# Patient Record
Sex: FEMALE | Race: BLACK OR AFRICAN AMERICAN
[De-identification: names, ages, dates, MRNs, and addresses within clinical notes are randomized per-mention and may not be internally consistent; named-entity substitution may affect disease eponyms.]

---

## 2021-04-29 ENCOUNTER — HOSPITAL ENCOUNTER (EMERGENCY)
Dept: HOSPITAL 12 - ER | Age: 36
LOS: 1 days | Discharge: HOME | End: 2021-04-30
Payer: COMMERCIAL

## 2021-04-29 VITALS — BODY MASS INDEX: 31.54 KG/M2 | WEIGHT: 178 LBS | HEIGHT: 63 IN

## 2021-04-29 DIAGNOSIS — Z20.822: ICD-10-CM

## 2021-04-29 DIAGNOSIS — J45.909: ICD-10-CM

## 2021-04-29 DIAGNOSIS — R05: Primary | ICD-10-CM

## 2021-04-29 PROCEDURE — A4663 DIALYSIS BLOOD PRESSURE CUFF: HCPCS

## 2021-04-30 VITALS — DIASTOLIC BLOOD PRESSURE: 87 MMHG | SYSTOLIC BLOOD PRESSURE: 122 MMHG

## 2021-04-30 NOTE — NUR
Patient discharged to home in stable condition.  Written and verbal after care 
instructions given. 

Patient verbalizes understanding of instructions. Stressed follow up or return 
to ER for worsening s/s.

All belongings with patient. VSS. Steady gait. Instructed not to drive.

## 2021-04-30 NOTE — NUR
36 y/o female presents to ED from home for persistent cough. Cough has been 
present for "couple months" but has gotten worse as of 6PM last night. Cough is 
dry, accompanied by a runny nose, usually present in the evenings, 1 episode of 
vommiting, and has a diminished sense of taste. Has tried taking Allegra for 
relief to no avail. 



Patient states she has not been able to sleep well because she has a 16 month 
old daughter at home.



A&Ox4. Pleasant and cooperative. No acute distress. 



NSR. BP non-remarkable. No chest pain, palpitations, diaphoresis, chills, 
afebrile. 



Saturation >94% on Room Air. Reports minimal SOB at home. 



GI/: No issues beside applicable Chief Complaints.